# Patient Record
Sex: FEMALE | Race: OTHER | NOT HISPANIC OR LATINO | URBAN - METROPOLITAN AREA
[De-identification: names, ages, dates, MRNs, and addresses within clinical notes are randomized per-mention and may not be internally consistent; named-entity substitution may affect disease eponyms.]

---

## 2020-06-22 ENCOUNTER — OUTPATIENT (OUTPATIENT)
Dept: OUTPATIENT SERVICES | Facility: HOSPITAL | Age: 30
LOS: 1 days | Discharge: HOME | End: 2020-06-22
Payer: MEDICAID

## 2020-06-22 VITALS — RESPIRATION RATE: 18 BRPM | TEMPERATURE: 98 F

## 2020-06-22 VITALS — DIASTOLIC BLOOD PRESSURE: 63 MMHG | SYSTOLIC BLOOD PRESSURE: 103 MMHG | HEART RATE: 73 BPM

## 2020-06-22 DIAGNOSIS — Z3A.00 WEEKS OF GESTATION OF PREGNANCY NOT SPECIFIED: ICD-10-CM

## 2020-06-22 DIAGNOSIS — O26.899 OTHER SPECIFIED PREGNANCY RELATED CONDITIONS, UNSPECIFIED TRIMESTER: ICD-10-CM

## 2020-06-22 LAB
APPEARANCE UR: CLEAR — SIGNIFICANT CHANGE UP
APPEARANCE UR: SIGNIFICANT CHANGE UP
BACTERIA # UR AUTO: HIGH
BILIRUB UR-MCNC: NEGATIVE — SIGNIFICANT CHANGE UP
BILIRUB UR-MCNC: NEGATIVE — SIGNIFICANT CHANGE UP
BLOOD UR QL VISUAL: NEGATIVE — SIGNIFICANT CHANGE UP
BLOOD UR QL VISUAL: NEGATIVE — SIGNIFICANT CHANGE UP
COLOR SPEC: SIGNIFICANT CHANGE UP
COLOR SPEC: SIGNIFICANT CHANGE UP
EPI CELLS # UR: SIGNIFICANT CHANGE UP
GLUCOSE UR-MCNC: NEGATIVE — SIGNIFICANT CHANGE UP
GLUCOSE UR-MCNC: NEGATIVE — SIGNIFICANT CHANGE UP
KETONES UR-MCNC: NEGATIVE — SIGNIFICANT CHANGE UP
KETONES UR-MCNC: NEGATIVE — SIGNIFICANT CHANGE UP
LEUKOCYTE ESTERASE UR-ACNC: NEGATIVE — SIGNIFICANT CHANGE UP
LEUKOCYTE ESTERASE UR-ACNC: SIGNIFICANT CHANGE UP
NITRITE UR-MCNC: NEGATIVE — SIGNIFICANT CHANGE UP
NITRITE UR-MCNC: NEGATIVE — SIGNIFICANT CHANGE UP
PH UR: 6.5 — SIGNIFICANT CHANGE UP (ref 5–8)
PH UR: 7 — SIGNIFICANT CHANGE UP (ref 5–8)
PROT UR-MCNC: NEGATIVE — SIGNIFICANT CHANGE UP
PROT UR-MCNC: NEGATIVE — SIGNIFICANT CHANGE UP
SP GR SPEC: 1 — SIGNIFICANT CHANGE UP (ref 1–1.04)
SP GR SPEC: 1.01 — SIGNIFICANT CHANGE UP (ref 1–1.04)
UROBILINOGEN FLD QL: NORMAL — SIGNIFICANT CHANGE UP
UROBILINOGEN FLD QL: NORMAL — SIGNIFICANT CHANGE UP
WBC UR QL: >50 — HIGH (ref 0–?)

## 2020-06-22 PROCEDURE — 99203 OFFICE O/P NEW LOW 30 MIN: CPT | Mod: 25

## 2020-06-22 PROCEDURE — 76818 FETAL BIOPHYS PROFILE W/NST: CPT | Mod: 26

## 2020-06-22 RX ORDER — ACETAMINOPHEN 500 MG
975 TABLET ORAL ONCE
Refills: 0 | Status: COMPLETED | OUTPATIENT
Start: 2020-06-22 | End: 2020-06-22

## 2020-06-22 RX ORDER — HUMAN INSULIN 100 [IU]/ML
10 INJECTION, SUSPENSION SUBCUTANEOUS
Qty: 0 | Refills: 0 | DISCHARGE

## 2020-06-22 NOTE — OB RN TRIAGE NOTE - PMH
Normal vaginal delivery  02/16/2009 7#0 Gestational diabetes  A2  Normal vaginal delivery  02/16/2009 7#0

## 2020-06-22 NOTE — OB PROVIDER TRIAGE NOTE - HISTORY OF PRESENT ILLNESS
29 y.o.   patient  ANUSHKA 2020 @ 36.2 weeks presents with c/o intermittent lower abdominal pain q 10 mins since 4am and dysuria. Pt denies hematuria, fever, chills, nausea, vomiting, LOF, VB. States +FM. Antepartum course complicated by GDMA2-insulin. Pt is registered for prenatal care at [facility].

## 2020-06-22 NOTE — OB PROVIDER TRIAGE NOTE - NSHPPHYSICALEXAM_GEN_ALL_CORE
abdomen soft, nontender  taus: sliup, cephalic presentation  sve: 1/50/-3/I  nst reactive  toco: occ ctx noted abdomen soft, nontender  taus: sliup, cephalic presentation, anterior placenta, bpp 8/8, burke 8.9  sve: 1/50/-3/I  nst reactive  toco: occ ctx noted

## 2020-06-22 NOTE — OB PROVIDER TRIAGE NOTE - NSOBPROVIDERNOTE_OBGYN_ALL_OB_FT
29 y.o.   patient  ANUSHKA 2020 @ 36.2 weeks presents with c/o intermittent lower abdominal pain q 10 mins since 4am and dysuria. Pt denies hematuria, fever, chills, nausea, vomiting, LOF, VB. States +FM. Pt states last BM this AM, normal. Antepartum course complicated by GDMA2-insulin. Fasting FS this  per patient. Pt is registered for prenatal care at [facility].    allergies:  NKDA  medications:  prenatal vitamins  humulin N - 10 units AM, 5 units HS  humulog 5units with breakfast, 5 units with dinner    med/surg hx:  denies  OBGYN hx:  2009  7 lbs    abdomen soft, nontender  taus: sliup, cephalic presentation  sve: /-3/I  nst reactive  toco: occ ctx noted    UA pending 29 y.o.   patient  ANUSHKA 2020 @ 36.2 weeks presents with c/o intermittent lower abdominal pain q 10 mins since 4am and dysuria. Pt denies hematuria, fever, chills, nausea, vomiting, LOF, VB. States +FM. Pt states last BM this AM, normal. Antepartum course complicated by GDMA2-insulin. Fasting FS this  per patient. Pt is registered for prenatal care at [facility].    allergies:  NKDA  medications:  prenatal vitamins  humulin N - 10 units AM, 5 units HS  humulog 5units with breakfast, 5 units with dinner    med/surg hx:  denies  OBGYN hx:  2009  7 lbs    rectal temp 36.8  abdomen soft, nontender  taus: sliup, cephalic presentation  sve: /-3/I  nst reactive  toco: occ ctx noted    UA pending 29 y.o.   patient  ANUSHKA 2020 @ 36.2 weeks presents with c/o intermittent lower abdominal pain q 10 mins since 4am and dysuria. Pt denies hematuria, fever, chills, nausea, vomiting, LOF, VB. States +FM. Pt states last BM this AM, normal. Antepartum course complicated by GDMA2-insulin. Fasting FS this  per patient. Pt is registered for prenatal care at [facility].    allergies:  NKDA  medications:  prenatal vitamins  humulin N - 10 units AM, 5 units HS  humulog 5units with breakfast, 5 units with dinner    med/surg hx:  denies  OBGYN hx:  2009  7 lbs    rectal temp 36.8  abdomen soft, nontender  taus: sliup, cephalic presentation  sve: 50/-3/I  nst reactive  toco: occ ctx noted    UA pending    11a  UA contaminated    12a  repeat UA negative  sve no change 50/-3/I  bpp 8/8, burke 8.9    nst reactive   toco: ctx q 5-10 mins 29 y.o.  Bhutanese patient  ANUSHKA 2020 @ 36.2 weeks presents with c/o intermittent lower abdominal pain q 10 mins since 4am and dysuria. Pt denies hematuria, fever, chills, nausea, vomiting, LOF, VB. States +FM. Pt states last BM this AM, normal. Antepartum course complicated by GDMA2-insulin. Fasting FS this  per patient. Pt is registered for prenatal care at [facility].    allergies:  NKDA  medications:  prenatal vitamins  humulin N - 10 units AM, 5 units HS  humulog 5units with breakfast, 5 units with dinner    med/surg hx:  denies  OBGYN hx:  2009  7 lbs    rectal temp 36.8  abdomen soft, nontender  taus: sliup, cephalic presentation  sve: 50/-3/I  nst reactive  toco: occ ctx noted    UA pending  pt states pain 9/10 however declining tylenol    1025a  variable decel noted. Pt for prolonged monitoring    11a  UA contaminated    12a  repeat UA negative  sve no change /-3/I  bpp 8/8, burke 8.9  pt reports "lessening pain"    nst reactive   toco: ctx q 5-10 mins    discussed with Dr Khan. Pt to continue on prolonged NST.    Megan, NP 29 y.o.  Iceland[place] patient  ANUSHKA 2020 @ 36.2 weeks presents with c/o intermittent lower abdominal pain q 10 mins since 4am and dysuria. Pt denies hematuria, fever, chills, nausea, vomiting, LOF, VB. States +FM. Pt states last BM this AM, normal. Antepartum course complicated by GDMA2-insulin. Fasting FS this  per patient. Pt is registered for prenatal care at [facility].    allergies:  NKDA  medications:  prenatal vitamins  humulin N - 10 units AM, 5 units HS  humulog 5units with breakfast, 5 units with dinner    med/surg hx:  denies  OBGYN hx:  2009  7 lbs    rectal temp 36.8  abdomen soft, nontender  taus: sliup, cephalic presentation  sve: 50/-3/I  nst reactive  toco: occ ctx noted    UA pending  pt states pain 9/10 however declining tylenol    1025a  variable decel noted. Pt for prolonged monitoring    11a  UA contaminated    12a  repeat UA negative  sve no change /-3/I  bpp 8/8, burke 8.9  pt reports "lessening pain"    nst reactive   toco: ctx q 5-10 mins    discussed with Dr Khan. Pt to continue on prolonged NST.    1300  Pt discharged home per Dr Khan.  labor precautions/fetal kick counts reviewed. Encouraged increased PO hydration. F/U next scheduled prenatal appointment.     GREY Guzman

## 2020-06-22 NOTE — OB PROVIDER TRIAGE NOTE - NS_SONODONE_OBGYN_ALL_OB
Problem: Patient Care Overview  Goal: Plan of Care Review  Outcome: Ongoing (interventions implemented as appropriate)  AOx4, VSS, denies pain. Nocturnal tube feeds @ 40cc/hr. No c/o N/V. TF up @ 2155 to be taken down at 0800 5/8. Accuchecks ACHS +2AM. Remains on continuous insulin gtt @ 0.6units/hr; increased to 1.3units once TF started. Remains on tele - ST. Remains free from falls. Bed remains locked and lowered. Personal items and call light w/in reach. NAD. WCTM       Yes

## 2020-07-03 ENCOUNTER — OUTPATIENT (OUTPATIENT)
Dept: INPATIENT UNIT | Facility: HOSPITAL | Age: 30
LOS: 1 days | Discharge: HOME | End: 2020-07-03
Payer: MEDICAID

## 2020-07-03 VITALS — HEART RATE: 85 BPM | SYSTOLIC BLOOD PRESSURE: 119 MMHG | DIASTOLIC BLOOD PRESSURE: 80 MMHG

## 2020-07-03 VITALS
DIASTOLIC BLOOD PRESSURE: 80 MMHG | RESPIRATION RATE: 16 BRPM | SYSTOLIC BLOOD PRESSURE: 119 MMHG | HEART RATE: 85 BPM | TEMPERATURE: 98 F

## 2020-07-03 DIAGNOSIS — O26.899 OTHER SPECIFIED PREGNANCY RELATED CONDITIONS, UNSPECIFIED TRIMESTER: ICD-10-CM

## 2020-07-03 DIAGNOSIS — Z3A.00 WEEKS OF GESTATION OF PREGNANCY NOT SPECIFIED: ICD-10-CM

## 2020-07-03 PROBLEM — O24.419 GESTATIONAL DIABETES MELLITUS IN PREGNANCY, UNSPECIFIED CONTROL: Chronic | Status: ACTIVE | Noted: 2020-06-22

## 2020-07-03 PROCEDURE — 76818 FETAL BIOPHYS PROFILE W/NST: CPT | Mod: 26

## 2020-07-03 PROCEDURE — 59025 FETAL NON-STRESS TEST: CPT | Mod: 26

## 2020-07-03 RX ORDER — HUMAN INSULIN 100 [IU]/ML
10 INJECTION, SUSPENSION SUBCUTANEOUS
Qty: 0 | Refills: 0 | DISCHARGE

## 2020-07-03 RX ORDER — HUMAN INSULIN 100 [IU]/ML
5 INJECTION, SUSPENSION SUBCUTANEOUS
Qty: 0 | Refills: 0 | DISCHARGE

## 2020-07-03 RX ORDER — INSULIN LISPRO 100/ML
5 VIAL (ML) SUBCUTANEOUS
Qty: 0 | Refills: 0 | DISCHARGE

## 2020-07-03 NOTE — OB PROVIDER TRIAGE NOTE - HISTORY OF PRESENT ILLNESS
History is as per patient.  Lab results retrieved from hardcopies presented by patient.  PNC Provider:  [facility]  Patient is scheduled for IOL 2' GDMA2, at [facility] on 7/10/2020.  Last sonogram  Last pn visit .    Patient is a 28y/o  @ 37 6/7wks gestation who reports to triage with c/o worsening contractions for the past 3 days; they became more frequent ( q5-6min) since 0300.  Denies loss of fluid or vaginal bleeding.  Reports good fetal movements.    AP Course complicated by GDMA2; on insulin.  Meds - pnv, Humalounits at breakfast & 9units at dinner.  Humulin N 12units at breakfast and 7units at bedtime

## 2020-07-03 NOTE — OB PROVIDER TRIAGE NOTE - NSOBPROVIDERNOTE_OBGYN_ALL_OB_FT
History is as per patient.  Lab results retrieved from hardcopies presented by patient.  PNC Provider:  [facility]  Patient is scheduled for IOL 2' GDMA2, at [facility] on 7/10/2020.  Last sonogram  Last pn visit .      Patient is a 28y/o  @ 37 6/7wks gestation who reports to triage with c/o worsening contractions for the past 3 days; they became more frequent ( q5-6min) since 0300.  Denies loss of fluid or vaginal bleeding.  Reports good fetal movements.    AP Course complicated by GDMA2; on insulin.  Meds - pnv, Humalounits at breakfast & 9units at dinner.  Humulin N 12units at breakfast and 7units at bedtime  NKDA    PMH/PSH/GYN/SH - denies  OB -  - 2009 - 8lbs in [facility]    Abdomen gravid, soft and nontender  NST - CAT 1 FHT  SVE - 2/60/-3 Intact  Cephalic presentation  GBS - negative  Clinical EFW - 3000G    Discussed patient with  History is as per patient.  Lab results retrieved from hardcopies presented by patient.  PNC Provider:  [facility]  Patient is scheduled for IOL 2' GDMA2, at [facility] on 7/10/2020.  Last sonogram  Last pn visit .  She was found to be 1 cm dilated.      Patient is a 28y/o  @ 37 6/7wks gestation who reports to triage with c/o worsening contractions for the past 3 days; they became more frequent ( q5-6min) since 0300.  Denies loss of fluid or vaginal bleeding.  Reports good fetal movements.    AP Course complicated by GDMA2; on insulin.  Meds - pnv, Humalounits at breakfast & 9units at dinner.  Humulin N 12units at breakfast and 7units at bedtime  NKDA    PMH/PSH/GYN/SH - denies  OB -  - 2009 - 8lbs in [facility]    Abdomen gravid, soft and nontender  NST - CAT 1 FHT  SVE - 260/-3 Intact  Cephalic presentation  GBS - negative  Clinical EFW - 3000G    Discussed patient with Dr Maria.  Plan:  patient is cleared for discharge home with labor precautions,  ROM also discussed with patient. History is as per patient.  Lab results retrieved from hardcopies presented by patient.  PNC Provider:  [facility]  Patient is scheduled for IOL 2' GDMA2, at [facility] on 7/10/2020.  Last sonogram  Last pn visit .  She was found to be 1 cm dilated.      Patient is a 28y/o  @ 37 6/7wks gestation who reports to triage with c/o worsening contractions for the past 3 days; they became more frequent ( q5-6min) since 0300.  Denies loss of fluid or vaginal bleeding.  Reports good fetal movements.    AP Course complicated by GDMA2; on insulin.  Meds - pnv, Humalounits at breakfast & 9units at dinner.  Humulin N 12units at breakfast and 7units at bedtime  NKDA    PMH/PSH/GYN/SH - denies  OB -  - 2009 - 8lbs in [facility]    Vital Signs   T(C): 36.9 (2020 09:19), Max: 36.9 (2020 09:19)  T(F): 98.4 (2020 09:19), Max: 98.4 (2020 09:19)  HR: 85 (2020 09:20) (85 - 85)  BP: 119/80 (2020 09:20) (119/80 - 119/80)  RR: 16 (2020 09:19) (16 - 16)    Abdomen gravid, soft and nontender  NST - CAT 1 FHT  SVE - 2/60/-3 Intact  Cephalic presentation/ant plac/burke 11.64  BPP - 8/8  GBS - negative  Clinical EFW - 3000G    Discussed patient with Dr Maria.  Plan:  patient is cleared for discharge home with labor precautions,  ROM also discussed with patient.

## 2020-07-03 NOTE — OB PROVIDER TRIAGE NOTE - ADDITIONAL INSTRUCTIONS
Early labor  Patient is cleared for discharge home with labor precautions,  ROM also discussed with patient.

## 2020-07-03 NOTE — OB PROVIDER TRIAGE NOTE - NSHPPHYSICALEXAM_GEN_ALL_CORE
Vital Signs Last 24 Hrs  T(C): 36.9 (03 Jul 2020 09:19), Max: 36.9 (03 Jul 2020 09:19)  T(F): 98.4 (03 Jul 2020 09:19), Max: 98.4 (03 Jul 2020 09:19)  HR: 85 (03 Jul 2020 09:20) (85 - 85)  BP: 119/80 (03 Jul 2020 09:20) (119/80 - 119/80)  BP(mean): --  RR: 16 (03 Jul 2020 09:19) (16 - 16)  Abdomen gravid, soft and nontender  NST - CAT 1 FHT  SVE - 2/60/-3 Intact  Cephalic presentation  GBS - negative  Clinical EFW - 3000G Vital Signs Last 24 Hrs  T(C): 36.9 (03 Jul 2020 09:19), Max: 36.9 (03 Jul 2020 09:19)  T(F): 98.4 (03 Jul 2020 09:19), Max: 98.4 (03 Jul 2020 09:19)  HR: 85 (03 Jul 2020 09:20) (85 - 85)  BP: 119/80 (03 Jul 2020 09:20) (119/80 - 119/80)  BP(mean): --  RR: 16 (03 Jul 2020 09:19) (16 - 16)    Abdomen gravid, soft and nontender  NST - CAT 1 FHT  SVE - 2/60/-3 Intact  Cephalic presentation/ant plac/burke 11.64  BPP - 8/8  GBS - negative  Clinical EFW - 3000G

## 2020-07-08 ENCOUNTER — INPATIENT (INPATIENT)
Facility: HOSPITAL | Age: 30
LOS: 1 days | Discharge: HOME | End: 2020-07-10
Attending: SPECIALIST | Admitting: SPECIALIST

## 2020-07-08 VITALS — SYSTOLIC BLOOD PRESSURE: 113 MMHG | DIASTOLIC BLOOD PRESSURE: 74 MMHG | HEART RATE: 86 BPM

## 2020-07-08 DIAGNOSIS — O26.899 OTHER SPECIFIED PREGNANCY RELATED CONDITIONS, UNSPECIFIED TRIMESTER: ICD-10-CM

## 2020-07-08 DIAGNOSIS — Z3A.00 WEEKS OF GESTATION OF PREGNANCY NOT SPECIFIED: ICD-10-CM

## 2020-07-08 LAB
AMPHET UR-MCNC: NEGATIVE — SIGNIFICANT CHANGE UP
BARBITURATES UR SCN-MCNC: NEGATIVE — SIGNIFICANT CHANGE UP
BASOPHILS # BLD AUTO: 0.05 K/UL — SIGNIFICANT CHANGE UP (ref 0–0.2)
BASOPHILS NFR BLD AUTO: 0.4 % — SIGNIFICANT CHANGE UP (ref 0–2)
BENZODIAZ UR-MCNC: NEGATIVE — SIGNIFICANT CHANGE UP
BLD GP AB SCN SERPL QL: NEGATIVE — SIGNIFICANT CHANGE UP
CANNABINOIDS UR-MCNC: NEGATIVE — SIGNIFICANT CHANGE UP
COCAINE METAB.OTHER UR-MCNC: NEGATIVE — SIGNIFICANT CHANGE UP
EOSINOPHIL # BLD AUTO: 0.18 K/UL — SIGNIFICANT CHANGE UP (ref 0–0.5)
EOSINOPHIL NFR BLD AUTO: 1.4 % — SIGNIFICANT CHANGE UP (ref 0–6)
HBV SURFACE AG SER-ACNC: NEGATIVE — SIGNIFICANT CHANGE UP
HCT VFR BLD CALC: 33.8 % — LOW (ref 34.5–45)
HGB BLD-MCNC: 11.3 G/DL — LOW (ref 11.5–15.5)
HIV COMBO RESULT: SIGNIFICANT CHANGE UP
HIV1+2 AB SPEC QL: SIGNIFICANT CHANGE UP
IMM GRANULOCYTES NFR BLD AUTO: 0.6 % — SIGNIFICANT CHANGE UP (ref 0–1.5)
LYMPHOCYTES # BLD AUTO: 18.5 % — SIGNIFICANT CHANGE UP (ref 13–44)
LYMPHOCYTES # BLD AUTO: 2.37 K/UL — SIGNIFICANT CHANGE UP (ref 1–3.3)
MCHC RBC-ENTMCNC: 27.2 PG — SIGNIFICANT CHANGE UP (ref 27–34)
MCHC RBC-ENTMCNC: 33.4 % — SIGNIFICANT CHANGE UP (ref 32–36)
MCV RBC AUTO: 81.4 FL — SIGNIFICANT CHANGE UP (ref 80–100)
METHADONE UR-MCNC: NEGATIVE — SIGNIFICANT CHANGE UP
MONOCYTES # BLD AUTO: 0.68 K/UL — SIGNIFICANT CHANGE UP (ref 0–0.9)
MONOCYTES NFR BLD AUTO: 5.3 % — SIGNIFICANT CHANGE UP (ref 2–14)
NEUTROPHILS # BLD AUTO: 9.45 K/UL — HIGH (ref 1.8–7.4)
NEUTROPHILS NFR BLD AUTO: 73.8 % — SIGNIFICANT CHANGE UP (ref 43–77)
NRBC # FLD: 0 K/UL — SIGNIFICANT CHANGE UP (ref 0–0)
OPIATES UR-MCNC: NEGATIVE — SIGNIFICANT CHANGE UP
OXYCODONE UR-MCNC: NEGATIVE — SIGNIFICANT CHANGE UP
PCP UR-MCNC: NEGATIVE — SIGNIFICANT CHANGE UP
PLATELET # BLD AUTO: 273 K/UL — SIGNIFICANT CHANGE UP (ref 150–400)
PMV BLD: 11.4 FL — SIGNIFICANT CHANGE UP (ref 7–13)
RBC # BLD: 4.15 M/UL — SIGNIFICANT CHANGE UP (ref 3.8–5.2)
RBC # FLD: 14.6 % — HIGH (ref 10.3–14.5)
RH IG SCN BLD-IMP: POSITIVE — SIGNIFICANT CHANGE UP
RH IG SCN BLD-IMP: POSITIVE — SIGNIFICANT CHANGE UP
T PALLIDUM AB TITR SER: NEGATIVE — SIGNIFICANT CHANGE UP
WBC # BLD: 12.81 K/UL — HIGH (ref 3.8–10.5)
WBC # FLD AUTO: 12.81 K/UL — HIGH (ref 3.8–10.5)

## 2020-07-08 RX ORDER — OXYTOCIN 10 UNIT/ML
333.33 VIAL (ML) INJECTION
Qty: 20 | Refills: 0 | Status: DISCONTINUED | OUTPATIENT
Start: 2020-07-08 | End: 2020-07-09

## 2020-07-08 RX ORDER — MAGNESIUM HYDROXIDE 400 MG/1
30 TABLET, CHEWABLE ORAL
Refills: 0 | Status: DISCONTINUED | OUTPATIENT
Start: 2020-07-08 | End: 2020-07-10

## 2020-07-08 RX ORDER — SODIUM CHLORIDE 9 MG/ML
1000 INJECTION, SOLUTION INTRAVENOUS
Refills: 0 | Status: DISCONTINUED | OUTPATIENT
Start: 2020-07-08 | End: 2020-07-08

## 2020-07-08 RX ORDER — OXYTOCIN 10 UNIT/ML
333.33 VIAL (ML) INJECTION
Qty: 20 | Refills: 0 | Status: DISCONTINUED | OUTPATIENT
Start: 2020-07-08 | End: 2020-07-08

## 2020-07-08 RX ORDER — SODIUM CHLORIDE 9 MG/ML
3 INJECTION INTRAMUSCULAR; INTRAVENOUS; SUBCUTANEOUS EVERY 8 HOURS
Refills: 0 | Status: DISCONTINUED | OUTPATIENT
Start: 2020-07-08 | End: 2020-07-10

## 2020-07-08 RX ORDER — SODIUM CHLORIDE 9 MG/ML
1000 INJECTION, SOLUTION INTRAVENOUS
Refills: 0 | Status: DISCONTINUED | OUTPATIENT
Start: 2020-07-08 | End: 2020-07-09

## 2020-07-08 RX ORDER — LANOLIN
1 OINTMENT (GRAM) TOPICAL EVERY 6 HOURS
Refills: 0 | Status: DISCONTINUED | OUTPATIENT
Start: 2020-07-08 | End: 2020-07-10

## 2020-07-08 RX ORDER — DIBUCAINE 1 %
1 OINTMENT (GRAM) RECTAL EVERY 6 HOURS
Refills: 0 | Status: DISCONTINUED | OUTPATIENT
Start: 2020-07-08 | End: 2020-07-10

## 2020-07-08 RX ORDER — AER TRAVELER 0.5 G/1
1 SOLUTION RECTAL; TOPICAL EVERY 4 HOURS
Refills: 0 | Status: DISCONTINUED | OUTPATIENT
Start: 2020-07-08 | End: 2020-07-10

## 2020-07-08 RX ORDER — DIPHENHYDRAMINE HCL 50 MG
25 CAPSULE ORAL EVERY 6 HOURS
Refills: 0 | Status: DISCONTINUED | OUTPATIENT
Start: 2020-07-08 | End: 2020-07-10

## 2020-07-08 RX ORDER — HYDROCORTISONE 1 %
1 OINTMENT (GRAM) TOPICAL EVERY 6 HOURS
Refills: 0 | Status: DISCONTINUED | OUTPATIENT
Start: 2020-07-08 | End: 2020-07-10

## 2020-07-08 RX ORDER — IBUPROFEN 200 MG
600 TABLET ORAL EVERY 6 HOURS
Refills: 0 | Status: COMPLETED | OUTPATIENT
Start: 2020-07-08 | End: 2021-06-06

## 2020-07-08 RX ORDER — KETOROLAC TROMETHAMINE 30 MG/ML
30 SYRINGE (ML) INJECTION ONCE
Refills: 0 | Status: DISCONTINUED | OUTPATIENT
Start: 2020-07-08 | End: 2020-07-08

## 2020-07-08 RX ORDER — PRAMOXINE HYDROCHLORIDE 150 MG/15G
1 AEROSOL, FOAM RECTAL EVERY 4 HOURS
Refills: 0 | Status: DISCONTINUED | OUTPATIENT
Start: 2020-07-08 | End: 2020-07-10

## 2020-07-08 RX ORDER — BENZOCAINE 10 %
1 GEL (GRAM) MUCOUS MEMBRANE EVERY 6 HOURS
Refills: 0 | Status: DISCONTINUED | OUTPATIENT
Start: 2020-07-08 | End: 2020-07-10

## 2020-07-08 RX ORDER — OXYCODONE HYDROCHLORIDE 5 MG/1
5 TABLET ORAL
Refills: 0 | Status: DISCONTINUED | OUTPATIENT
Start: 2020-07-08 | End: 2020-07-10

## 2020-07-08 RX ORDER — OXYCODONE HYDROCHLORIDE 5 MG/1
5 TABLET ORAL ONCE
Refills: 0 | Status: DISCONTINUED | OUTPATIENT
Start: 2020-07-08 | End: 2020-07-10

## 2020-07-08 RX ORDER — SIMETHICONE 80 MG/1
80 TABLET, CHEWABLE ORAL EVERY 4 HOURS
Refills: 0 | Status: DISCONTINUED | OUTPATIENT
Start: 2020-07-08 | End: 2020-07-10

## 2020-07-08 RX ORDER — ACETAMINOPHEN 500 MG
975 TABLET ORAL
Refills: 0 | Status: DISCONTINUED | OUTPATIENT
Start: 2020-07-08 | End: 2020-07-10

## 2020-07-08 RX ORDER — TETANUS TOXOID, REDUCED DIPHTHERIA TOXOID AND ACELLULAR PERTUSSIS VACCINE, ADSORBED 5; 2.5; 8; 8; 2.5 [IU]/.5ML; [IU]/.5ML; UG/.5ML; UG/.5ML; UG/.5ML
0.5 SUSPENSION INTRAMUSCULAR ONCE
Refills: 0 | Status: DISCONTINUED | OUTPATIENT
Start: 2020-07-08 | End: 2020-07-10

## 2020-07-08 RX ADMIN — Medication 30 MILLIGRAM(S): at 22:34

## 2020-07-08 RX ADMIN — Medication 1000 MILLIUNIT(S)/MIN: at 21:15

## 2020-07-08 RX ADMIN — SODIUM CHLORIDE 3 MILLILITER(S): 9 INJECTION INTRAMUSCULAR; INTRAVENOUS; SUBCUTANEOUS at 22:18

## 2020-07-08 NOTE — OB PROVIDER H&P - ASSESSMENT
30yo  female  @ 38.4 wks SLIUP GDMA2 with PNC @ [facility] here with SROM since 1317 and in active labor  -VE-4/80/-2; clear fluid  -GBS neg 6/24  -pt declining epidural at this time  -UTox ordered and pt consented  -pt and support person swabbed for Covid-19  -pt ordered for fingersticks Q1 with rotating fluids  -pt was dw Dr Connor

## 2020-07-08 NOTE — OB RN DELIVERY SUMMARY - NS_SEPSISRSKCALC_OBGYN_ALL_OB_FT
EOS calculated successfully. EOS Risk Factor: 0.5/1000 live births ([facility] national incidence); GA=38w4d; Temp=98.1; ROM=7.55; GBS='Unknown'; Antibiotics='No antibiotics or any antibiotics < 2 hrs prior to birth'

## 2020-07-08 NOTE — OB PROVIDER TRIAGE NOTE - HISTORY OF PRESENT ILLNESS
29yi Bangladeshi female  @ 38.4 wks SLIUP GDMA2 with PNC @ [facility] here with spontaneous rupture of clear membranes since 1317 with frequent painful ctx's.    Pmhx-denies  Pshx/Hosp-denies  Meds-PNV;            NPH 12u with breakfasts; 7u at bedtime           Lispro 7u QAM, 9u with dinner  NKDA  Past ob-2009-8#  FT uncomp  Gyn-denies

## 2020-07-08 NOTE — OB RN PATIENT PROFILE - NS_ZIKATRAVEL_OBGYN_ALL_OB
Continued Stay Note  [facility]     Patient Name: Vidhi Lopez  MRN: [id number]  Today's Date: 12/9/2019    Admit Date: 12/4/2019    Discharge Plan     Row Name 12/09/19 1357       Plan    Plan  [place]    Plan Comments  Per Larisa with [facility], patient accepted and bed available for DC. Notified Nanette/[facility] and RN        Discharge Codes    No documentation.       Expected Discharge Date and Time     Expected Discharge Date Expected Discharge Time    Dec 7, 2019             Zari Alves RN     No

## 2020-07-08 NOTE — OB PROVIDER TRIAGE NOTE - NSOBPROVIDERNOTE_OBGYN_ALL_OB_FT
28yo  female  @ 38.4 wks SLIUP GDMA2 with PNC @ [facility] here with SROM since 1317 and in active labor  -VE-4/80/-2; clear fluid  -GBS neg 6/24  -pt declining epidural at this time  -UTox ordered and pt consented  -pt and support person swabbed for Covid-19  -pt ordered for fingersticks Q1 with rotating fluids  -pt was dw Dr Connor

## 2020-07-08 NOTE — OB PROVIDER H&P - HISTORY OF PRESENT ILLNESS
29yi Syrian female  @ 38.4 wks SLIUP GDMA2 with PNC @ [facility] here with spontaneous rupture of clear membranes since 1317 with frequent painful ctx's.    Pmhx-denies  Pshx/Hosp-denies  Meds-PNV;            NPH 12u with breakfasts; 7u at bedtime           Lispro 7u QAM, 9u with dinner  NKDA  Past ob-2009-8#  FT uncomp  Gyn-denies

## 2020-07-08 NOTE — CHART NOTE - NSCHARTNOTEFT_GEN_A_CORE
R3 prog note    Pt examined due to increased pain, requesting epidural      VE: 5/80/-3  EFM: 130/mod/+accels/early decels  [place]: Q5min      T(C): 36.6 (07-08-20 @ 15:55), Max: 36.7 (07-08-20 @ 14:21)  HR: 79 (07-08-20 @ 16:44) (74 - 88)  BP: 101/63 (07-08-20 @ 15:55) (101/63 - 113/74)  RR: 18 (07-08-20 @ 15:55) (18 - 18)  SpO2: 97% (07-08-20 @ 16:44) (96% - 100%)      Plan:  -Approved for epidural  -VE after epidural      D/w Dr. Geeta doyle pgy-3

## 2020-07-08 NOTE — CHART NOTE - NSCHARTNOTEFT_GEN_A_CORE
PGY1 Labor & Delivery Progress Note     Pt examined s/p epidural placement      SVE: 5.5/80/-1  EFM: 120 baseline, mod variability, +accels, +variable decels  [place]: q4-5 min      T(C): 36.6 (07-08-20 @ 15:55), Max: 36.7 (07-08-20 @ 14:21)  HR: 75 (07-08-20 @ 17:54) (72 - 88)  BP: 92/55 (07-08-20 @ 17:52) (89/53 - 113/74)  RR: 18 (07-08-20 @ 15:55) (18 - 18)  SpO2: 99% (07-08-20 @ 17:54) (94% - 100%)      Plan:  Expectant management  Recheck in 2 hrs  If unchanged consider adding Pitocin    Plan d/w Dr. Ledbetter, PGY-3    Elizabeth Morrell MD  PGY-1 PGY1 Labor & Delivery Progress Note     Pt examined s/p epidural placement      SVE: 9.5/0/0  EFM: 120 baseline, mod variability, +accels, +variable decels  [place]: q4-5 min      T(C): 36.6 (07-08-20 @ 15:55), Max: 36.7 (07-08-20 @ 14:21)  HR: 75 (07-08-20 @ 17:54) (72 - 88)  BP: 92/55 (07-08-20 @ 17:52) (89/53 - 113/74)  RR: 18 (07-08-20 @ 15:55) (18 - 18)  SpO2: 99% (07-08-20 @ 17:54) (94% - 100%)      Plan:  Terbutaline x1   Continue to resuscitate     Dr. Hook is at the bedside    Elizabeth Morrell MD  PGY-1

## 2020-07-08 NOTE — OB PROVIDER TRIAGE NOTE - NSHPPHYSICALEXAM_GEN_ALL_CORE
Gen: A&O x 3; NAD  Vitals: BP-113/74; P-86; T-36.7    Pulm-CTA B/L; no wheezes  Cor-Clear S1S2; no murmurs  Abd exam-soft and nontender with strong ctx's    NST cat I with 135 baseline +accels and mod variability; ctx's Q3min  EFW~3033  GBS neg 6/24    VE-4/80/-2; grossly ruptured with clear fluid

## 2020-07-09 LAB
RUBV IGG SER-ACNC: 0.5 INDEX — SIGNIFICANT CHANGE UP
RUBV IGG SER-IMP: NEGATIVE — SIGNIFICANT CHANGE UP
SARS-COV-2 RNA SPEC QL NAA+PROBE: SIGNIFICANT CHANGE UP

## 2020-07-09 RX ORDER — INSULIN LISPRO 100/ML
7 VIAL (ML) SUBCUTANEOUS
Qty: 0 | Refills: 0 | DISCHARGE

## 2020-07-09 RX ORDER — HUMAN INSULIN 100 [IU]/ML
7 INJECTION, SUSPENSION SUBCUTANEOUS
Qty: 0 | Refills: 0 | DISCHARGE

## 2020-07-09 RX ORDER — IBUPROFEN 200 MG
600 TABLET ORAL EVERY 6 HOURS
Refills: 0 | Status: DISCONTINUED | OUTPATIENT
Start: 2020-07-09 | End: 2020-07-10

## 2020-07-09 RX ORDER — HUMAN INSULIN 100 [IU]/ML
12 INJECTION, SUSPENSION SUBCUTANEOUS
Qty: 0 | Refills: 0 | DISCHARGE

## 2020-07-09 RX ORDER — ACETAMINOPHEN 500 MG
3 TABLET ORAL
Qty: 0 | Refills: 0 | DISCHARGE
Start: 2020-07-09

## 2020-07-09 RX ORDER — IBUPROFEN 200 MG
1 TABLET ORAL
Qty: 0 | Refills: 0 | DISCHARGE
Start: 2020-07-09

## 2020-07-09 RX ADMIN — Medication 1 TABLET(S): at 12:19

## 2020-07-09 RX ADMIN — Medication 975 MILLIGRAM(S): at 20:42

## 2020-07-09 RX ADMIN — Medication 975 MILLIGRAM(S): at 00:11

## 2020-07-09 RX ADMIN — Medication 600 MILLIGRAM(S): at 17:30

## 2020-07-09 RX ADMIN — SODIUM CHLORIDE 3 MILLILITER(S): 9 INJECTION INTRAMUSCULAR; INTRAVENOUS; SUBCUTANEOUS at 06:09

## 2020-07-09 RX ADMIN — SODIUM CHLORIDE 3 MILLILITER(S): 9 INJECTION INTRAMUSCULAR; INTRAVENOUS; SUBCUTANEOUS at 12:20

## 2020-07-09 RX ADMIN — Medication 975 MILLIGRAM(S): at 12:10

## 2020-07-09 RX ADMIN — Medication 600 MILLIGRAM(S): at 04:02

## 2020-07-09 RX ADMIN — Medication 600 MILLIGRAM(S): at 23:50

## 2020-07-09 RX ADMIN — Medication 975 MILLIGRAM(S): at 06:08

## 2020-07-09 NOTE — PROGRESS NOTE ADULT - PROBLEM SELECTOR PLAN 1
- Continue with po analgesia  - Increase ambulation  - Continue regular diet  - IV lock  - No labs  - d/c planning in process    Tita Rollins, PGY1

## 2020-07-09 NOTE — OB PROVIDER DELIVERY SUMMARY - NSPROVIDERDELIVERYNOTE_OBGYN_ALL_OB_FT
Spontaneous vaginal delivery of liveborn F infant from OA position. Head, shoulders, and body delivered easily. Infant was suctioned. No mec. Delayed cord clamping performed. Cord was clamped and cut and infant was passed to mother. Placenta delivered intact with a 3 vessel cord. Fundal massage was given and uterine fundus was found to be firm. Vaginal exam revealed an intact cervix, vaginal walls and sulci. Patient stable. Count was correct x 2.

## 2020-07-09 NOTE — PROGRESS NOTE ADULT - SUBJECTIVE AND OBJECTIVE BOX
Patient seen and examined at bedside, no acute overnight events. No acute complaints, pain well controlled. Patient is ambulating, voiding spontaneously, passing gas, and tolerating regular diet. Denies CP, SOB, N/V, HA, blurred vision, epigastric pain.    Vital Signs Last 24 Hours  T(C): 36.7 (07-09-20 @ 05:42), Max: 36.8 (07-08-20 @ 23:40)  HR: 70 (07-09-20 @ 05:42) (70 - 110)  BP: 98/57 (07-09-20 @ 05:42) (89/53 - 132/79)  RR: 18 (07-09-20 @ 05:42) (18 - 18)  SpO2: 100% (07-09-20 @ 05:42) (94% - 100%)    Physical Exam:  General: NAD  Abdomen: Soft, non-tender, non-distended, fundus firm  Pelvic: Lochia wnl    Labs:    Blood Type: A Positive  Antibody Screen: --  Rubella IgG: Negative (Positive=Immune, Negative=Non-Immune)  RPR: Negative               11.3   12.81 )-----------( 273      ( 07-08 @ 15:30 )             33.8         MEDICATIONS  (STANDING):  acetaminophen   Tablet .. 975 milliGRAM(s) Oral <User Schedule>  diphtheria/tetanus/pertussis (acellular) Vaccine (ADAcel) 0.5 milliLiter(s) IntraMuscular once  ibuprofen  Tablet. 600 milliGRAM(s) Oral every 6 hours  prenatal multivitamin 1 Tablet(s) Oral daily  sodium chloride 0.9% lock flush 3 milliLiter(s) IV Push every 8 hours    MEDICATIONS  (PRN):  benzocaine 20%/menthol 0.5% Spray 1 Spray(s) Topical every 6 hours PRN for Perineal discomfort  dibucaine 1% Ointment 1 Application(s) Topical every 6 hours PRN Perineal discomfort  diphenhydrAMINE 25 milliGRAM(s) Oral every 6 hours PRN Pruritus  hydrocortisone 1% Cream 1 Application(s) Topical every 6 hours PRN Moderate Pain (4-6)  lanolin Ointment 1 Application(s) Topical every 6 hours PRN nipple soreness  magnesium hydroxide Suspension 30 milliLiter(s) Oral two times a day PRN Constipation  oxyCODONE    IR 5 milliGRAM(s) Oral every 3 hours PRN Moderate to Severe Pain (4-10)  oxyCODONE    IR 5 milliGRAM(s) Oral once PRN Moderate to Severe Pain (4-10)  pramoxine 1%/zinc 5% Cream 1 Application(s) Topical every 4 hours PRN Moderate Pain (4-6)  simethicone 80 milliGRAM(s) Chew every 4 hours PRN Gas  witch hazel Pads 1 Application(s) Topical every 4 hours PRN Perineal discomfort

## 2020-07-09 NOTE — PROVIDER CONTACT NOTE (OTHER) - ASSESSMENT
Patient blood glucose upon arrival of unit was 162. Patient stated that she has apple juice right before coming up to unit.

## 2020-07-10 ENCOUNTER — TRANSCRIPTION ENCOUNTER (OUTPATIENT)
Age: 30
End: 2020-07-10

## 2020-07-10 VITALS
SYSTOLIC BLOOD PRESSURE: 103 MMHG | DIASTOLIC BLOOD PRESSURE: 70 MMHG | TEMPERATURE: 98 F | HEART RATE: 74 BPM | RESPIRATION RATE: 17 BRPM | OXYGEN SATURATION: 100 %

## 2020-07-10 RX ADMIN — Medication 600 MILLIGRAM(S): at 05:06

## 2020-07-10 NOTE — DISCHARGE NOTE OB - CARE PROVIDER_API CALL
[facility],   Please follow-up for postpartum appointment in 4-6 weeks at Ambulatory Clinic Unit, [facility], [facility], [facility]. Please call the office for an appointment phone # [phone]  Phone: [phone]  Fax: (   )    -  Follow Up Time:

## 2020-07-10 NOTE — DISCHARGE NOTE OB - MEDICATION SUMMARY - MEDICATIONS TO TAKE
I will START or STAY ON the medications listed below when I get home from the hospital:    acetaminophen 325 mg oral tablet  -- 3 tab(s) by mouth , As Needed  -- Indication: For pain control    ibuprofen 600 mg oral tablet  -- 1 tab(s) by mouth every 6 hours, As Needed  -- Indication: For pain control

## 2020-07-10 NOTE — DISCHARGE NOTE OB - HOSPITAL COURSE
Patient had an uncomplicated  followed by an uncomplicated postpartum course. EBL , Hct as below. On postpartum day 2, patient was discharged home in stable condition, voiding spontaneously and with normal vital signs.

## 2020-07-10 NOTE — PROGRESS NOTE ADULT - PROBLEM SELECTOR PLAN 1
- Fingersticks 24hr PP have been wnl  - Continue with po analgesia  - Increase ambulation  - Continue regular diet  - IV lock  - No labs  - discharge planning in progress    Tita Rollins, PGY1

## 2020-07-10 NOTE — DISCHARGE NOTE OB - ADDITIONAL INSTRUCTIONS
Make your follow-up appointment with your doctor as ordered. Call the clinic for your follow up appointment in 4-6 weeks. No heavy lifting, driving, or strenuous activity for 6 weeks. Nothing per vagina such as tampons, intercourse, douches or tub baths for 6 weeks or until you see your doctor. Call your doctor with any signs and symptoms of infection such as fever, chills, nausea or vomiting. Call your doctor if you’re unable to tolerate food, increase in vaginal bleeding, or have difficulty urinating. Call your doctor if you have pain that is not relieved by your prescribed medications. Notify your doctor with any other concerns. Call [phone] if you have any of these concerns in the next 6 weeks.

## 2020-07-10 NOTE — PROGRESS NOTE ADULT - ATTENDING COMMENTS
Associate Chief of L & D (Late entry)     I have not met this patient before today.  she was admitted by Dr Connor with GDMA2 in early labor and delivered by Dr YORDY Larios    OB Progress Note:  PPD#1    S: 30yo  PPD#1 s/p . Patient feels well. Pain is well controlled. She is tolerating a regular diet and passing flatus. She is voiding spontaneously, and ambulating without difficulty. Denies CP/SOB. Denies lightheadedness/dizziness. Denies N/V.    O:  Vitals:  Vital Signs Last 24 Hrs  T(C): 36.7 (10 Jul 2020 05:52), Max: 36.9 (2020 17:39)  T(F): 98 (10 Jul 2020 05:52), Max: 98.5 (2020 17:39)  HR: 74 (10 Jul 2020 05:52) (74 - 82)  BP: 103/70 (10 Jul 2020 05:52) (103/70 - 109/71)  RR: 17 (10 Jul 2020 05:52) (17 - 18)  SpO2: 100% (10 Jul 2020 05:52) (99% - 100%)    MEDICATIONS  (STANDING):  acetaminophen   Tablet .. 975 milliGRAM(s) Oral <User Schedule>  diphtheria/tetanus/pertussis (acellular) Vaccine (ADAcel) 0.5 milliLiter(s) IntraMuscular once  ibuprofen  Tablet. 600 milliGRAM(s) Oral every 6 hours  prenatal multivitamin 1 Tablet(s) Oral daily  sodium chloride 0.9% lock flush 3 milliLiter(s) IV Push every 8 hours      Labs:  Blood type: A Positive  Rubella IgG: Negative ( @ 16:20)  RPR: Negative                          11.3<L>   12.81<H> >-----------< 273    (  @ 15:30 )             33.8<L>        Physical Exam:  General: NAD  Abdomen: soft, non-tender, non-distended, fundus firm  Vaginal: Lochia wnl  Extremities: No erythema/edema    A/P: 30yo PPD#2 s/p .  Patient is stable and doing well post-partum.   - Pain well controlled, continue current pain regimen  -  Patient is stable for discharge and will follow up in the  clinic    Nanci Trammell M.D., M.DEMETRIUSA., M.S.

## 2020-07-10 NOTE — DISCHARGE NOTE OB - CARE PLAN
Principal Discharge DX:	Normal vaginal delivery  Goal:	postpartum wellness  Assessment and plan of treatment:	Make your follow-up appointment with your doctor as ordered. Call the clinic for your follow up appointment in 4-6 weeks. No heavy lifting, driving, or strenuous activity for 6 weeks. Nothing per vagina such as tampons, intercourse, douches or tub baths for 6 weeks or until you see your doctor. Call your doctor with any signs and symptoms of infection such as fever, chills, nausea or vomiting. Call your doctor if you’re unable to tolerate food, increase in vaginal bleeding, or have difficulty urinating. Call your doctor if you have pain that is not relieved by your prescribed medications. Notify your doctor with any other concerns. Call [phone] if you have any of these concerns in the next 6 weeks.

## 2020-07-10 NOTE — DISCHARGE NOTE OB - PROVIDER TOKENS
FREE:[LAST:[[facility]],PHONE:[phone]],FAX:[phone]    -],ADDRESS:[Please follow-up for postpartum appointment in 4-6 weeks at Ambulatory Clinic Unit, [facility], Oncology Building, [facility]. Please call the office for an appointment phone # [phone]]]

## 2020-07-10 NOTE — DISCHARGE NOTE OB - PATIENT PORTAL LINK FT
You can access the FollowMyHealth Patient Portal offered by [facility] by registering at the following website: [email or web address]. By joining [facility]’s FollowMyHealth portal, you will also be able to view your health information using other applications (apps) compatible with our system.

## 2020-07-10 NOTE — PROGRESS NOTE ADULT - SUBJECTIVE AND OBJECTIVE BOX
Patient seen and examined at bedside, no acute overnight events. No acute complaints, pain well controlled. Patient is eager to go home today. Patient is ambulating, voiding spontaneously, passing gas, and tolerating regular diet. Denies CP, SOB, N/V, HA, blurred vision, epigastric pain.    Vital Signs Last 24 Hours  T(C): 36.7 (07-10-20 @ 05:52), Max: 36.9 (07-09-20 @ 17:39)  HR: 74 (07-10-20 @ 05:52) (74 - 82)  BP: 103/70 (07-10-20 @ 05:52) (103/70 - 109/71)  RR: 17 (07-10-20 @ 05:52) (17 - 18)  SpO2: 100% (07-10-20 @ 05:52) (99% - 100%)    Physical Exam:  General: NAD  Abdomen: Soft, non-tender, non-distended, fundus firm  Pelvic: Lochia wnl    Labs:    Blood Type: A Positive  Antibody Screen: --  Rubella IgG: Negative (Positive=Immune, Negative=Non-Immune)  RPR: Negative    FS: 77->106->100               11.3   12.81 )-----------( 273      ( 07-08 @ 15:30 )             33.8         MEDICATIONS  (STANDING):  acetaminophen   Tablet .. 975 milliGRAM(s) Oral <User Schedule>  diphtheria/tetanus/pertussis (acellular) Vaccine (ADAcel) 0.5 milliLiter(s) IntraMuscular once  ibuprofen  Tablet. 600 milliGRAM(s) Oral every 6 hours  prenatal multivitamin 1 Tablet(s) Oral daily  sodium chloride 0.9% lock flush 3 milliLiter(s) IV Push every 8 hours    MEDICATIONS  (PRN):  benzocaine 20%/menthol 0.5% Spray 1 Spray(s) Topical every 6 hours PRN for Perineal discomfort  dibucaine 1% Ointment 1 Application(s) Topical every 6 hours PRN Perineal discomfort  diphenhydrAMINE 25 milliGRAM(s) Oral every 6 hours PRN Pruritus  hydrocortisone 1% Cream 1 Application(s) Topical every 6 hours PRN Moderate Pain (4-6)  lanolin Ointment 1 Application(s) Topical every 6 hours PRN nipple soreness  magnesium hydroxide Suspension 30 milliLiter(s) Oral two times a day PRN Constipation  oxyCODONE    IR 5 milliGRAM(s) Oral every 3 hours PRN Moderate to Severe Pain (4-10)  oxyCODONE    IR 5 milliGRAM(s) Oral once PRN Moderate to Severe Pain (4-10)  pramoxine 1%/zinc 5% Cream 1 Application(s) Topical every 4 hours PRN Moderate Pain (4-6)  simethicone 80 milliGRAM(s) Chew every 4 hours PRN Gas  witch hazel Pads 1 Application(s) Topical every 4 hours PRN Perineal discomfort Patient seen and examined at bedside, no acute overnight events. No acute complaints, pain well controlled. Patient is eager to go home today. Patient is ambulating, voiding spontaneously, passing gas, and tolerating regular diet. Denies CP, SOB, N/V, HA, blurred vision, epigastric pain.    Vital Signs Last 24 Hours  T(C): 36.7 (07-10-20 @ 05:52), Max: 36.9 (07-09-20 @ 17:39)  HR: 74 (07-10-20 @ 05:52) (74 - 82)  BP: 103/70 (07-10-20 @ 05:52) (103/70 - 109/71)  RR: 17 (07-10-20 @ 05:52) (17 - 18)  SpO2: 100% (07-10-20 @ 05:52) (99% - 100%)    Physical Exam:  General: NAD  Abdomen: Soft, non-tender, non-distended, fundus firm  Pelvic: Lochia wnl    Labs:    Blood Type: A Positive  Antibody Screen: --  Rubella IgG: Negative (Positive=Immune, Negative=Non-Immune)  RPR: Negative    FS: 77->100->106               11.3   12.81 )-----------( 273      ( 07-08 @ 15:30 )             33.8         MEDICATIONS  (STANDING):  acetaminophen   Tablet .. 975 milliGRAM(s) Oral <User Schedule>  diphtheria/tetanus/pertussis (acellular) Vaccine (ADAcel) 0.5 milliLiter(s) IntraMuscular once  ibuprofen  Tablet. 600 milliGRAM(s) Oral every 6 hours  prenatal multivitamin 1 Tablet(s) Oral daily  sodium chloride 0.9% lock flush 3 milliLiter(s) IV Push every 8 hours    MEDICATIONS  (PRN):  benzocaine 20%/menthol 0.5% Spray 1 Spray(s) Topical every 6 hours PRN for Perineal discomfort  dibucaine 1% Ointment 1 Application(s) Topical every 6 hours PRN Perineal discomfort  diphenhydrAMINE 25 milliGRAM(s) Oral every 6 hours PRN Pruritus  hydrocortisone 1% Cream 1 Application(s) Topical every 6 hours PRN Moderate Pain (4-6)  lanolin Ointment 1 Application(s) Topical every 6 hours PRN nipple soreness  magnesium hydroxide Suspension 30 milliLiter(s) Oral two times a day PRN Constipation  oxyCODONE    IR 5 milliGRAM(s) Oral every 3 hours PRN Moderate to Severe Pain (4-10)  oxyCODONE    IR 5 milliGRAM(s) Oral once PRN Moderate to Severe Pain (4-10)  pramoxine 1%/zinc 5% Cream 1 Application(s) Topical every 4 hours PRN Moderate Pain (4-6)  simethicone 80 milliGRAM(s) Chew every 4 hours PRN Gas  witch hazel Pads 1 Application(s) Topical every 4 hours PRN Perineal discomfort

## 2020-07-10 NOTE — DISCHARGE NOTE OB - PLAN OF CARE
postpartum wellness Make your follow-up appointment with your doctor as ordered. Call the clinic for your follow up appointment in 4-6 weeks. No heavy lifting, driving, or strenuous activity for 6 weeks. Nothing per vagina such as tampons, intercourse, douches or tub baths for 6 weeks or until you see your doctor. Call your doctor with any signs and symptoms of infection such as fever, chills, nausea or vomiting. Call your doctor if you’re unable to tolerate food, increase in vaginal bleeding, or have difficulty urinating. Call your doctor if you have pain that is not relieved by your prescribed medications. Notify your doctor with any other concerns. Call [phone] if you have any of these concerns in the next 6 weeks.

## 2020-07-11 ENCOUNTER — TRANSCRIPTION ENCOUNTER (OUTPATIENT)
Age: 30
End: 2020-07-11

## 2020-07-13 ENCOUNTER — NON-APPOINTMENT (OUTPATIENT)
Age: 30
End: 2020-07-13

## 2020-07-13 PROBLEM — Z00.00 ENCOUNTER FOR PREVENTIVE HEALTH EXAMINATION: Status: ACTIVE | Noted: 2020-07-13

## 2020-07-13 RX ORDER — PNV NO.95/FERROUS FUM/FOLIC AC 28MG-0.8MG
TABLET ORAL DAILY
Refills: 0 | Status: ACTIVE | COMMUNITY
Start: 2020-07-13

## 2020-12-28 NOTE — OB RN PATIENT PROFILE - CURRENT PREGNANCY COMPLICATIONS, OB PROFILE
Gestational Diabetes

## 2021-06-15 NOTE — OB PROVIDER H&P - LABOR: CERVICAL CONSISTENCY
Order for Durable Medical Equipment was processed and equipment ordered.   DME provider: [facility]  Date Faxed: 02/07/2018  Ordering Provider: Dr. Viera  Equipment ordered: FLAVIA    
medium
